# Patient Record
Sex: MALE | Race: WHITE | Employment: UNEMPLOYED | URBAN - NONMETROPOLITAN AREA
[De-identification: names, ages, dates, MRNs, and addresses within clinical notes are randomized per-mention and may not be internally consistent; named-entity substitution may affect disease eponyms.]

---

## 2022-01-01 ENCOUNTER — HOSPITAL ENCOUNTER (EMERGENCY)
Age: 42
Discharge: HOME | End: 2022-07-06
Attending: FAMILY MEDICINE
Payer: MEDICAID

## 2022-01-01 ENCOUNTER — HOSPITAL ENCOUNTER (EMERGENCY)
Age: 42
End: 2022-07-08
Attending: EMERGENCY MEDICINE
Payer: MEDICAID

## 2022-01-01 VITALS
SYSTOLIC BLOOD PRESSURE: 134 MMHG | DIASTOLIC BLOOD PRESSURE: 99 MMHG | TEMPERATURE: 98 F | HEART RATE: 105 BPM | WEIGHT: 165 LBS | RESPIRATION RATE: 16 BRPM | OXYGEN SATURATION: 98 %

## 2022-01-01 DIAGNOSIS — K40.90 INDIRECT LEFT INGUINAL HERNIA: Primary | ICD-10-CM

## 2022-01-01 DIAGNOSIS — I46.9 CARDIOPULMONARY ARREST (HCC): Primary | ICD-10-CM

## 2022-01-01 PROCEDURE — 99285 EMERGENCY DEPT VISIT HI MDM: CPT

## 2022-01-01 PROCEDURE — 92950 HEART/LUNG RESUSCITATION CPR: CPT

## 2022-01-01 PROCEDURE — 99283 EMERGENCY DEPT VISIT LOW MDM: CPT

## 2022-01-01 RX ORDER — LEVETIRACETAM 500 MG/1
500 TABLET ORAL 2 TIMES DAILY
COMMUNITY

## 2022-07-06 NOTE — ED PROVIDER NOTES
[address]  eMERGENCY dEPARTMENT eNCOUnter          [facility]       Chief Complaint   Patient presents with    Groin Pain     left groin pain x2-3 weeks. Patient believes he has a hernia. Nurses Notes reviewed and I agree except as noted in the HPI. HISTORY OF PRESENT ILLNESS    Massiel Marion is a 42 y.o. male who presents to the emergency room via EMS from home, patient concern for a bulge in his groin area for the past 2 to 3 weeks, and thinks he may have a hernia. Patient states he is noticed a bulge, often worse by nighttime, and does not he can push it back in. Patient denies any dysuria hematuria denies trauma or falls. Denies any current pain. REVIEW OF SYSTEMS     Review of Systems   All other systems reviewed and are negative. PAST MEDICAL HISTORY    has a past medical history of Arthritis, Carpal tunnel syndrome, and Seizures ([facility] [address] SURGICAL HISTORY      has no past surgical history on file. CURRENT MEDICATIONS       Discharge Medication List as of 7/6/2022  2:14 AM      CONTINUE these medications which have NOT CHANGED    Details   levETIRAcetam (KEPPRA) 500 MG tablet Take 500 mg by mouth 2 times dailyHistorical Med             ALLERGIES     has No Known Allergies. FAMILY HISTORY     has no family status information on file. family history is not on file. SOCIAL HISTORY      reports that he has been smoking cigarettes. He has been smoking about 1.50 packs per day. He has never used smokeless tobacco. He reports current alcohol use. PHYSICAL EXAM     INITIAL VITALS:  weight is 165 lb (74.8 kg). His temperature is 98 °F (36.7 °C). His blood pressure is 134/99 (abnormal) and his pulse is 105 (abnormal). His respiration is 16 and oxygen saturation is 98%. Physical Exam   Constitutional: Patient is oriented to person, place, and time. Patient appears well-developed and well-nourished. Patient is active and cooperative. Neck: Full passive range of motion without pain and phonation normal.   Cardiovascular:  Normal rate, regular rhythm and intact distal pulses. Pulses: Right radial pulse  2+   Pulmonary/Chest: Effort normal. No tachypnea and no bradypnea. Abdominal: Soft. Patient without distension or tenderness  Focused examination of patient's pelvic area, shows subtle bulging over the left triangle of Hesselbach, consistent with indirect hernia, reducible    Except as otherwise noted, negative acute trauma or deformity,  apparent full range of motion and normal strength all extremities appropriate to age. Neurological: Patient is alert and oriented to person, place, and time. patient displays no tremor. Patient displays no seizure activity. .  Lymphatic: No inguinal lymphadenopathy  Skin: Skin is warm and dry. Patient is not diaphoretic. Psychiatric: Patient has a normal mood and pleasant affect.  Patient speech is normal and behavior is normal. Cognition and memory are normal.    DIFFERENTIAL DIAGNOSIS:   hernia    DIAGNOSTIC RESULTS           RADIOLOGY: non-plain film images(s) such as CT, Ultrasound and MRI are read by the radiologist.  No orders to display       LABS:   Labs Reviewed - No data to display    EMERGENCY DEPARTMENT COURSE:   Vitals:    Vitals:    07/06/22 0122   BP: (!) 134/99   Pulse: (!) 105   Resp: 16   Temp: 98 °F (36.7 °C)   SpO2: 98%   Weight: 165 lb (74.8 kg)     Patient history and physical exam taken at bedside, discussed patient symptoms and exam findings, discussed clinical diagnosis indirect left inguinal hernia based on patient's description and physical exam findings, discussed need for evaluation for surgery for possible repair, we discussed signs symptoms to watch for, importance of making sure to reducible when it does bulge out, if not patient needs to be seen immediately in the emergency room as he may need emergent versus immediate surgery depending on incarcerated versus strangulated hernia. At this time we will go ahead and discharge patient home, outpatient follow-up, referral given for covering surgeon, patient acknowledges    FINAL IMPRESSION      1. Indirect left inguinal hernia          DISPOSITION/PLAN   Discharge    PATIENT REFERRED TO:  Rose Anand MD  [address]  [phone]    Call   [address] [address] [phone]    As needed, If symptoms worsen      DISCHARGE MEDICATIONS:  Discharge Medication List as of 7/6/2022  2:14 AM              Summation      Patient Course: Discharge    ED Medications administered this visit:  Medications - No data to display    New Prescriptions from this visit:    Discharge Medication List as of 7/6/2022  2:14 AM          Follow-up:  Rose Anand MD  [address]  [phone]    Call   [address] [address] [phone]    As needed, If symptoms worsen        Final Impression:   1.  Indirect left inguinal hernia               (Please note that portions of this note were completed with a voice recognition program.  Efforts were made to edit the dictations but occasionally words are mis-transcribed.)    MD Fiona Dos Santos MD  07/06/22 3808

## 2022-07-08 NOTE — ED NOTES
Arrived via [facility], CPR in progress for Cardiac arrest at 0410. Squad has been providing CPR at scene and in route, effort continues for one hour time. Report called to this writer that 4 rounds of epinephrine was administered along with one dose of Narcan with no response, patient remains in Asystole on monitor. Continue ACLS protocols per EMS. Also given in report that patient has had 3 seizures since 9:00 pm and wife called 911 when patient fell off couch, patient has history of seizures. Wife states patient has stopped taking his medication. Physician at cart side and no respiratory effort, pulses or blood pressure noted. Patient placed on monitor and reads asystole. Physician calls time of death 80.          Keyuranitha Sutton, RN  07/08/22 9869

## 2022-07-08 NOTE — PROGRESS NOTES
Akshat Levy from [facility] called stating they will not be following for donation. Free to release.